# Patient Record
Sex: MALE | Race: WHITE
[De-identification: names, ages, dates, MRNs, and addresses within clinical notes are randomized per-mention and may not be internally consistent; named-entity substitution may affect disease eponyms.]

---

## 2017-11-14 ENCOUNTER — HOSPITAL ENCOUNTER (EMERGENCY)
Dept: HOSPITAL 54 - ER | Age: 27
Discharge: HOME | End: 2017-11-14
Payer: COMMERCIAL

## 2017-11-14 VITALS — HEIGHT: 66 IN | WEIGHT: 135 LBS | BODY MASS INDEX: 21.69 KG/M2

## 2017-11-14 VITALS — DIASTOLIC BLOOD PRESSURE: 89 MMHG | SYSTOLIC BLOOD PRESSURE: 126 MMHG
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
## 2017-11-14 DIAGNOSIS — R45.851: ICD-10-CM

## 2017-11-14 DIAGNOSIS — F32.9: ICD-10-CM
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
## 2017-11-14 DIAGNOSIS — Z23: ICD-10-CM
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
## 2017-11-14 DIAGNOSIS — S61.411A: Primary | ICD-10-CM
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
## 2017-11-14 DIAGNOSIS — S60.221A: ICD-10-CM

## 2017-11-14 PROCEDURE — A4606 OXYGEN PROBE USED W OXIMETER: HCPCS

## 2017-11-14 PROCEDURE — Z7610: HCPCS

## 2017-11-14 NOTE — NUR
Patient discharged to home in stable condition. Written and verbal after care 
instructions given. Patient verbalizes understanding of instruction. PT TO F/U 
WITH PYSCHIATRIST IN AM. PT AMBULATED OUT WITH A STEADY GAIT. VSS.

## 2017-11-14 NOTE — NUR
NEOSOPORIN APPLIED TO PT'S ABRASIONS, AREA COVERED WITH NON ADHERENT DRSG, AND 
WRAPPED WITH KERLEX.
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
## 2020-09-25 ENCOUNTER — HOSPITAL ENCOUNTER (EMERGENCY)
Dept: HOSPITAL 54 - ER | Age: 30
Discharge: HOME | End: 2020-09-25
Payer: COMMERCIAL

## 2020-09-25 VITALS — BODY MASS INDEX: 27.49 KG/M2 | HEIGHT: 65 IN | WEIGHT: 165 LBS

## 2020-09-25 VITALS — DIASTOLIC BLOOD PRESSURE: 89 MMHG | SYSTOLIC BLOOD PRESSURE: 144 MMHG

## 2020-09-25 DIAGNOSIS — R42: ICD-10-CM

## 2020-09-25 DIAGNOSIS — R00.2: ICD-10-CM

## 2020-09-25 DIAGNOSIS — F32.9: ICD-10-CM

## 2020-09-25 DIAGNOSIS — R07.89: ICD-10-CM

## 2020-09-25 DIAGNOSIS — F14.10: Primary | ICD-10-CM

## 2020-09-25 DIAGNOSIS — F41.9: ICD-10-CM

## 2020-09-25 DIAGNOSIS — D72.829: ICD-10-CM

## 2020-09-25 LAB
ALBUMIN SERPL BCP-MCNC: 4.3 G/DL (ref 3.4–5)
ALP SERPL-CCNC: 61 U/L (ref 46–116)
ALT SERPL W P-5'-P-CCNC: 23 U/L (ref 12–78)
AST SERPL W P-5'-P-CCNC: 11 U/L (ref 15–37)
BASOPHILS # BLD AUTO: 0 /CMM (ref 0–0.2)
BASOPHILS NFR BLD AUTO: 0.2 % (ref 0–2)
BILIRUB DIRECT SERPL-MCNC: 0.1 MG/DL (ref 0–0.2)
BILIRUB SERPL-MCNC: 0.4 MG/DL (ref 0.2–1)
BUN SERPL-MCNC: 9 MG/DL (ref 7–18)
CALCIUM SERPL-MCNC: 9.3 MG/DL (ref 8.5–10.1)
CHLORIDE SERPL-SCNC: 102 MMOL/L (ref 98–107)
CO2 SERPL-SCNC: 28 MMOL/L (ref 21–32)
CREAT SERPL-MCNC: 1 MG/DL (ref 0.6–1.3)
EOSINOPHIL NFR BLD AUTO: 0.7 % (ref 0–6)
GLUCOSE SERPL-MCNC: 112 MG/DL (ref 74–106)
HCT VFR BLD AUTO: 47 % (ref 39–51)
HGB BLD-MCNC: 15.9 G/DL (ref 13.5–17.5)
LYMPHOCYTES NFR BLD AUTO: 0.7 /CMM (ref 0.8–4.8)
LYMPHOCYTES NFR BLD AUTO: 5 % (ref 20–44)
MCHC RBC AUTO-ENTMCNC: 34 G/DL (ref 31–36)
MCV RBC AUTO: 83 FL (ref 80–96)
MONOCYTES NFR BLD AUTO: 0.6 /CMM (ref 0.1–1.3)
MONOCYTES NFR BLD AUTO: 3.7 % (ref 2–12)
NEUTROPHILS # BLD AUTO: 13.5 /CMM (ref 1.8–8.9)
NEUTROPHILS NFR BLD AUTO: 90.4 % (ref 43–81)
PLATELET # BLD AUTO: 240 /CMM (ref 150–450)
POTASSIUM SERPL-SCNC: 3.8 MMOL/L (ref 3.5–5.1)
PROT SERPL-MCNC: 7.8 G/DL (ref 6.4–8.2)
RBC # BLD AUTO: 5.69 MIL/UL (ref 4.5–6)
SODIUM SERPL-SCNC: 141 MMOL/L (ref 136–145)
WBC NRBC COR # BLD AUTO: 14.9 K/UL (ref 4.3–11)

## 2020-09-25 RX ADMIN — LORAZEPAM ONE MG: 1 TABLET ORAL at 18:03

## 2020-09-25 RX ADMIN — SODIUM CHLORIDE ONE ML: 9 INJECTION, SOLUTION INTRAVENOUS at 18:00
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
